# Patient Record
Sex: MALE | Race: WHITE | URBAN - METROPOLITAN AREA
[De-identification: names, ages, dates, MRNs, and addresses within clinical notes are randomized per-mention and may not be internally consistent; named-entity substitution may affect disease eponyms.]

---

## 2022-10-17 ENCOUNTER — OFFICE VISIT (OUTPATIENT)
Dept: URBAN - METROPOLITAN AREA CLINIC 30 | Facility: CLINIC | Age: 42
End: 2022-10-17
Payer: COMMERCIAL

## 2022-10-17 DIAGNOSIS — H40.1134 PRIMARY OPEN-ANGLE GLAUCOMA, INDETERMINATE, BILATERAL: Primary | ICD-10-CM

## 2022-10-17 PROCEDURE — 92004 COMPRE OPH EXAM NEW PT 1/>: CPT

## 2022-10-17 PROCEDURE — 76514 ECHO EXAM OF EYE THICKNESS: CPT

## 2022-10-17 PROCEDURE — 92133 CPTRZD OPH DX IMG PST SGM ON: CPT

## 2022-10-17 ASSESSMENT — KERATOMETRY
OS: 42.40
OD: 42.83

## 2022-10-17 ASSESSMENT — VISUAL ACUITY
OS: 20/20
OD: 20/20

## 2022-10-17 ASSESSMENT — INTRAOCULAR PRESSURE
OD: 15
OS: 15
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
